# Patient Record
Sex: MALE | Race: WHITE | NOT HISPANIC OR LATINO | Employment: FULL TIME | URBAN - METROPOLITAN AREA
[De-identification: names, ages, dates, MRNs, and addresses within clinical notes are randomized per-mention and may not be internally consistent; named-entity substitution may affect disease eponyms.]

---

## 2017-01-17 RX ORDER — LISINOPRIL 10 MG/1
10 TABLET ORAL DAILY
Qty: 30 TABLET | Refills: 0 | Status: SHIPPED | OUTPATIENT
Start: 2017-01-17 | End: 2017-02-13 | Stop reason: SDUPTHER

## 2017-02-13 RX ORDER — LISINOPRIL 10 MG/1
TABLET ORAL
Qty: 30 TABLET | Refills: 5 | Status: SHIPPED | OUTPATIENT
Start: 2017-02-13 | End: 2017-08-11 | Stop reason: SDUPTHER

## 2017-03-21 RX ORDER — CARVEDILOL 6.25 MG/1
TABLET ORAL
Qty: 180 TABLET | Refills: 0 | Status: SHIPPED | OUTPATIENT
Start: 2017-03-21 | End: 2017-06-25 | Stop reason: SDUPTHER

## 2017-03-23 ENCOUNTER — OFFICE VISIT (OUTPATIENT)
Dept: CARDIOLOGY | Facility: CLINIC | Age: 62
End: 2017-03-23

## 2017-03-23 VITALS
HEIGHT: 66 IN | DIASTOLIC BLOOD PRESSURE: 78 MMHG | HEART RATE: 56 BPM | BODY MASS INDEX: 31.82 KG/M2 | WEIGHT: 198 LBS | SYSTOLIC BLOOD PRESSURE: 132 MMHG

## 2017-03-23 DIAGNOSIS — I25.10 CORONARY ARTERY DISEASE INVOLVING NATIVE CORONARY ARTERY OF NATIVE HEART WITHOUT ANGINA PECTORIS: Primary | ICD-10-CM

## 2017-03-23 DIAGNOSIS — I10 ESSENTIAL HYPERTENSION: ICD-10-CM

## 2017-03-23 DIAGNOSIS — E11.59 TYPE 2 DIABETES MELLITUS WITH OTHER CIRCULATORY COMPLICATION: ICD-10-CM

## 2017-03-23 PROCEDURE — 99214 OFFICE O/P EST MOD 30 MIN: CPT | Performed by: INTERNAL MEDICINE

## 2017-03-23 PROCEDURE — 93000 ELECTROCARDIOGRAM COMPLETE: CPT | Performed by: INTERNAL MEDICINE

## 2017-03-23 RX ORDER — ATORVASTATIN CALCIUM 40 MG/1
1 TABLET, FILM COATED ORAL
COMMUNITY
Start: 2017-01-13

## 2017-03-23 NOTE — PROGRESS NOTES
Date of Office Visit: 2017  Encounter Provider: Brock Valle MD  Place of Service: [facility] CARDIOLOGY  Patient Name: Cuco Harvey  :1955      Chief Complaint   Patient presents with   • Coronary Artery Disease     History of Present Illness    Patient is a 61-year-old white male with a history of coronary artery disease.  Patient had an inferior myocardial infarction in  necessitating a stent placement to the right coronary artery.  In the past year he has done well without any recurrent angina pectoris.  In 2016 he had a stressed echocardiogram which shows normal left ventricular function and no evidence of ischemia.  He has developed diabetes mellitus and is now on oral medication.    The patient specifically denies chest discomfort, shortness of breath, lightheadedness, dizziness nor orthopnea.    Past Medical History:   Diagnosis Date   • CA cervix    • Glucose intolerance (impaired glucose tolerance)    • Hyperlipidemia    • Hypertension    • Ischemic cardiomyopathy    • Myocardial infarction          Past Surgical History:   Procedure Laterality Date   • CORONARY STENT PLACEMENT      BMS right coronary artery 2011   • VENOUS THROMBECTOMY             Current Outpatient Prescriptions:   •  aspirin  MG EC tablet, Take by mouth., Disp: , Rfl:   •  atorvastatin (LIPITOR) 40 MG tablet, Take 1 tablet by mouth every night at bedtime., Disp: , Rfl:   •  carvedilol (COREG) 6.25 MG tablet, TAKE ONE TABLET BY MOUTH TWICE A DAY, Disp: 180 tablet, Rfl: 0  •  lisinopril (PRINIVIL,ZESTRIL) 10 MG tablet, TAKE ONE TABLET BY MOUTH DAILY, Disp: 30 tablet, Rfl: 5  •  Magnesium 250 MG tablet, Take 2 tablets by mouth daily., Disp: , Rfl:   •  metFORMIN (GLUCOPHAGE) 500 MG tablet, Take 1 tablet by mouth 2 (Two) Times a Day., Disp: , Rfl:   •  Multiple Vitamins-Minerals (MULTI COMPLETE PO), Take by mouth daily., Disp: , Rfl:   •  nitroglycerin (NITROSTAT) 0.4 MG SL  "tablet, Place under the tongue., Disp: , Rfl:   •  Omega-3 Fatty Acids (FISH OIL) 1000 MG capsule capsule, Take 2 capsules by mouth daily., Disp: , Rfl:       Social History     Social History   • Marital status: Single     Spouse name: N/A   • Number of children: N/A   • Years of education: N/A     Occupational History   • Not on file.     Social History Main Topics   • Smoking status: Former Smoker   • Smokeless tobacco: Not on file   • Alcohol use Not on file   • Drug use: Not on file   • Sexual activity: Not on file     Other Topics Concern   • Not on file     Social History Narrative         Review of Systems   Constitution: Negative.   HENT: Negative.    Eyes: Negative.    Cardiovascular: Negative.    Respiratory: Negative.    Endocrine: Negative.    Skin: Negative.    Musculoskeletal: Negative.    Gastrointestinal: Negative.    Neurological: Negative.    Psychiatric/Behavioral: Negative.        Procedures      ECG 12 Lead  Date/Time: 3/23/2017 11:19 AM  Performed by: LACEY CORTEZ  Authorized by: LACEY CORTEZ   Comparison: compared with previous ECG from 1/27/2016  Rhythm: sinus rhythm  Rate: normal  Conduction: conduction normal  ST Segments: ST segments normal  QRS axis: normal                 Objective:    /78  Pulse 56  Ht 66\" (167.6 cm)  Wt 198 lb (89.8 kg)  BMI 31.96 kg/m2        Physical Exam   Constitutional: He is oriented to person, place, and time. He appears well-developed and well-nourished.   HENT:   Head: Normocephalic.   Eyes: Pupils are equal, round, and reactive to light.   Neck: Normal range of motion. No JVD present. Carotid bruit is not present. No thyromegaly present.   Cardiovascular: Normal rate, regular rhythm, S1 normal, S2 normal, normal heart sounds and intact distal pulses.  Exam reveals no gallop and no friction rub.    No murmur heard.  Pulmonary/Chest: Effort normal and breath sounds normal.   Abdominal: Soft. Bowel sounds are normal.   Musculoskeletal: " He exhibits no edema.   Neurological: He is alert and oriented to person, place, and time.   Skin: Skin is warm, dry and intact. No erythema.   Psychiatric: He has a normal mood and affect.   Vitals reviewed.          Assessment:       Diagnosis Plan   1. Coronary artery disease involving native coronary artery of native heart without angina pectoris     2. Essential hypertension     3. Type 2 diabetes mellitus with other circulatory complication              Plan:       Coronary Artery Disease  Assessment  • The patient has no angina    Plan  • Lifestyle modifications discussed include adhering to a heart healthy diet, maintenance of a healthy weight and regular exercise    Subjective - Objective  • There is a history of past MI 2011 inferior  • There has been a previous stent procedure using RAFAELA RCA  • Current antiplatelet therapy includes aspirin 81 mg        No changes were made in medication.  The patient will follow-up in a year

## 2017-06-26 RX ORDER — CARVEDILOL 6.25 MG/1
TABLET ORAL
Qty: 180 TABLET | Refills: 1 | Status: SHIPPED | OUTPATIENT
Start: 2017-06-26 | End: 2018-01-31 | Stop reason: SDUPTHER

## 2017-08-11 RX ORDER — LISINOPRIL 10 MG/1
TABLET ORAL
Qty: 30 TABLET | Refills: 6 | Status: SHIPPED | OUTPATIENT
Start: 2017-08-11 | End: 2018-07-08 | Stop reason: SDUPTHER

## 2018-01-31 RX ORDER — CARVEDILOL 6.25 MG/1
TABLET ORAL
Qty: 180 TABLET | Refills: 0 | Status: SHIPPED | OUTPATIENT
Start: 2018-01-31 | End: 2018-04-29 | Stop reason: SDUPTHER

## 2018-03-28 ENCOUNTER — OFFICE VISIT (OUTPATIENT)
Dept: CARDIOLOGY | Facility: CLINIC | Age: 63
End: 2018-03-28

## 2018-03-28 VITALS
HEIGHT: 66 IN | SYSTOLIC BLOOD PRESSURE: 104 MMHG | BODY MASS INDEX: 30.05 KG/M2 | WEIGHT: 187 LBS | DIASTOLIC BLOOD PRESSURE: 72 MMHG | HEART RATE: 52 BPM

## 2018-03-28 DIAGNOSIS — I10 ESSENTIAL HYPERTENSION: ICD-10-CM

## 2018-03-28 DIAGNOSIS — E78.5 HYPERLIPIDEMIA, UNSPECIFIED HYPERLIPIDEMIA TYPE: ICD-10-CM

## 2018-03-28 DIAGNOSIS — I25.10 CORONARY ARTERY DISEASE INVOLVING NATIVE CORONARY ARTERY OF NATIVE HEART WITHOUT ANGINA PECTORIS: Primary | ICD-10-CM

## 2018-03-28 DIAGNOSIS — E11.59 TYPE 2 DIABETES MELLITUS WITH OTHER CIRCULATORY COMPLICATION, WITHOUT LONG-TERM CURRENT USE OF INSULIN (HCC): ICD-10-CM

## 2018-03-28 PROCEDURE — 93000 ELECTROCARDIOGRAM COMPLETE: CPT | Performed by: INTERNAL MEDICINE

## 2018-03-28 PROCEDURE — 99214 OFFICE O/P EST MOD 30 MIN: CPT | Performed by: INTERNAL MEDICINE

## 2018-03-28 NOTE — PROGRESS NOTES
Date of Office Visit: 2018  Encounter Provider: Brock Valle MD  Place of Service: [facility] CARDIOLOGY  Patient Name: Cuco Harvey  :1955      Chief Complaint   Patient presents with   • Coronary Artery Disease     History of Present Illness    The patient is a 62-year-old white male with known coronary artery disease, hypertension, hyperlipidemia and diabetes mellitus.    The patient experienced an inferior myocardial infarction in .  He received a drug-eluting stent to the right coronary artery at that time.  A stress echocardiogram was performed in .  There was no evidence of ischemia and his left ventricular function is normal.  He does not have any complaints of angina pectoris, shortness of breath, lightheadedness, dizziness, orthopnea nor paroxysmal nocturnal dyspnea.    The patient is now on medication for diabetes mellitus.  He reports his blood sugar being under good control.  He also is on medication for hypertension and hyperlipidemia.  [facility] is going [facility].  He will followed on a copy to me.    Past Medical History:   Diagnosis Date   • CA cervix    • Glucose intolerance (impaired glucose tolerance)    • Hyperlipidemia    • Hypertension    • Ischemic cardiomyopathy    • Myocardial infarction          Past Surgical History:   Procedure Laterality Date   • CORONARY STENT PLACEMENT      BMS right coronary artery 2011   • VENOUS THROMBECTOMY             Current Outpatient Prescriptions:   •  aspirin  MG EC tablet, Take by mouth., Disp: , Rfl:   •  atorvastatin (LIPITOR) 40 MG tablet, Take 1 tablet by mouth every night at bedtime., Disp: , Rfl:   •  carvedilol (COREG) 6.25 MG tablet, TAKE ONE TABLET BY MOUTH TWICE A DAY, Disp: 180 tablet, Rfl: 0  •  dapagliflozin-metformin HCl ER (XIGDUO XR)  MG tablet, Take 2 tablets by mouth Daily., Disp: , Rfl:   •  lisinopril (PRINIVIL,ZESTRIL)  "10 MG tablet, TAKE ONE TABLET BY MOUTH DAILY, Disp: 30 tablet, Rfl: 6  •  Magnesium 250 MG tablet, Take 2 tablets by mouth daily., Disp: , Rfl:   •  Multiple Vitamins-Minerals (MULTI COMPLETE PO), Take by mouth daily., Disp: , Rfl:   •  nitroglycerin (NITROSTAT) 0.4 MG SL tablet, Place under the tongue., Disp: , Rfl:   •  Omega-3 Fatty Acids (FISH OIL) 1000 MG capsule capsule, Take 2 capsules by mouth daily., Disp: , Rfl:       Social History     Social History   • Marital status: Single     Spouse name: N/A   • Number of children: N/A   • Years of education: N/A     Occupational History   • Not on file.     Social History Main Topics   • Smoking status: Former Smoker   • Smokeless tobacco: Not on file   • Alcohol use Not on file   • Drug use: Unknown   • Sexual activity: Not on file     Other Topics Concern   • Not on file     Social History Narrative   • No narrative on file         Review of Systems   Constitution: Negative.   HENT: Negative.    Eyes: Negative.    Cardiovascular: Negative.    Respiratory: Negative.    Endocrine: Negative.    Skin: Negative.    Musculoskeletal: Negative.    Gastrointestinal: Negative.    Neurological: Negative.    Psychiatric/Behavioral: Negative.        Procedures      ECG 12 Lead  Date/Time: 3/28/2018 10:09 AM  Performed by: LACEY CORTEZ  Authorized by: LACEY CORTEZ   Comparison: compared with previous ECG from 8/23/2017  Similar to previous ECG  Rhythm: sinus rhythm  Rate: normal  Conduction: conduction normal  ST Segments: ST segments normal  T Waves: T waves normal  QRS axis: normal                  Objective:    /72   Pulse 52   Ht 167.6 cm (66\")   Wt 84.8 kg (187 lb)   BMI 30.18 kg/m²         Physical Exam   Constitutional: He is oriented to person, place, and time. He appears well-developed and well-nourished.   HENT:   Head: Normocephalic.   Eyes: Pupils are equal, round, and reactive to light.   Neck: Normal range of motion. No JVD present. " Carotid bruit is not present. No thyromegaly present.   Cardiovascular: Normal rate, regular rhythm, S1 normal, S2 normal, normal heart sounds and intact distal pulses.  Exam reveals no gallop and no friction rub.    No murmur heard.  Pulmonary/Chest: Effort normal and breath sounds normal.   Abdominal: Soft. Bowel sounds are normal.   Musculoskeletal: He exhibits no edema.   Neurological: He is alert and oriented to person, place, and time.   Skin: Skin is warm, dry and intact. No erythema.   Psychiatric: He has a normal mood and affect.   Vitals reviewed.          Assessment:       Diagnosis Plan   1. Coronary artery disease involving native coronary artery of native heart without angina pectoris     2. Essential hypertension     3. Hyperlipidemia, unspecified hyperlipidemia type     4. Type 2 diabetes mellitus with other circulatory complication, without long-term current use of insulin       1. Coronary Artery Disease  Assessment  • The patient has no angina    Plan  • Lifestyle modifications discussed include adhering to a heart healthy diet, maintenance of a healthy weight and regular exercise    Subjective - Objective  • There is a history of past MI  • There has been a previous stent procedure using RAFAELA RCA 2011  • Current antiplatelet therapy includes aspirin 81 mg      2.  Hypertension: Controlled  3.  Hyperlipidemia: On medical therapy  4.  Diabetes mellitus, type II on oral medication     Plan:       No change in medication at this time.  I will see him back in one year.

## 2018-04-30 RX ORDER — NITROGLYCERIN 0.4 MG/1
TABLET SUBLINGUAL
Qty: 25 TABLET | Refills: 5 | Status: SHIPPED | OUTPATIENT
Start: 2018-04-30

## 2018-04-30 RX ORDER — CARVEDILOL 6.25 MG/1
TABLET ORAL
Qty: 180 TABLET | Refills: 3 | Status: SHIPPED | OUTPATIENT
Start: 2018-04-30

## 2018-07-09 RX ORDER — LISINOPRIL 10 MG/1
TABLET ORAL
Qty: 30 TABLET | Refills: 11 | Status: SHIPPED | OUTPATIENT
Start: 2018-07-09

## 2021-03-16 ENCOUNTER — BULK ORDERING (OUTPATIENT)
Dept: CASE MANAGEMENT | Facility: OTHER | Age: 66
End: 2021-03-16

## 2021-03-16 DIAGNOSIS — Z23 IMMUNIZATION DUE: ICD-10-CM
